# Patient Record
(demographics unavailable — no encounter records)

---

## 2024-11-18 NOTE — HISTORY OF PRESENT ILLNESS
[Post-op Sandals] : post-op sandals [Wheelchair] : a wheelchair [FreeTextEntry1] : Patient was admitted at the Hospital 11/7/24 after he was seeing at Dr Orozco,s office Patient had left TMA 11/12/24 at [facility]. Patient s/p LLE angiogram with balloon angioplasty of SFA and Pop on 11/8 by Dr Orozco. Patient continues PO antibiotics and has keep dressing d/c/i.

## 2024-11-18 NOTE — REASON FOR VISIT
[Post Operative Visit] : a post operative visit for [Family Member] : family member [FreeTextEntry2] : left foot osteomyelitis/gangrene s/p Left TMA

## 2024-11-18 NOTE — ASSESSMENT
[FreeTextEntry1] : Patient is seen in the office following a right side transmetatarsal amputation with flap closure.  He is in a wheelchair accompanied by his son-in-law. He has not had any significant pain at this point is without weightbearing restrictions and will continue physical therapy at home The patient's left foot flap was noted to be warm and perfused the necrotic patch at the plantar aspect was present preoperatively and at the heel they have demarcated well and appear to be superficial.  There are no active signs of infection.  He will continue the doxycycline 100 mg twice daily for 2 weeks the patient is advised to follow-up at the wound center and be considered for hyperbaric oxygen therapy with the risk of ischemic flap well-padded warm dressing was replaced on the patient's left foot.

## 2024-11-18 NOTE — PHYSICAL EXAM
[de-identified] : TMA left foot  [FreeTextEntry1] : sutures intact left foot .  superficial necrotic patch in the flap as well as the heel right foot demarcated and has not progressed [Vibration Dec.] : diminished vibratory sensation at the level of the toes

## 2024-12-11 NOTE — REASON FOR VISIT
[de-identified] : LLE anigiogram with balloon angioplasty of SFA and popliteal artery [de-identified] : 11/9/24 [de-identified] : Mr. DAVID HERNANDEZ is a 97 year old who presents to the office for follow-up evaluation of his PAD left gangrene foot requiring TMA. Mr. HERNANDEZ presents for post procedure evaluation. He is s/p LLE angiogram with balloon angioplasty of SFA and Pop on 11/8/24. Patient is doing well. Mr. HERNANDEZ reports his leg heaviness, swelling and discomfort has improved, and he has no complaints. No fever or chills. He has been following up with wound care weekly.

## 2024-12-11 NOTE — PLAN
[TextEntry] : Left lower extremity arterial duplex in 1 month Wound care as per podiatry and wound care center. Continue Eliquis/Plavix/Statin

## 2024-12-11 NOTE — ASSESSMENT
[TextEntry] : Mr. DAVID HERNANDEZ is a 97 year old with now s/p LLE angiogram with balloon angioplasty of SFA and Pop on 11/8, post op left TMA, both unremarkable and following with podiatry and wound care.

## 2024-12-11 NOTE — PHYSICAL EXAM
[FreeTextEntry1] : No palpable pedal pulses, doppler signal only found on right AT. Left foot wrapped and right 1st digit wound covered. c/d/i no signs/symptoms of hematoma or pseudoaneurysm, bilateral groins are soft. no bruising.